# Patient Record
Sex: MALE | Race: WHITE | NOT HISPANIC OR LATINO
[De-identification: names, ages, dates, MRNs, and addresses within clinical notes are randomized per-mention and may not be internally consistent; named-entity substitution may affect disease eponyms.]

---

## 2023-11-08 ENCOUNTER — DASHBOARD ENCOUNTERS (OUTPATIENT)
Age: 83
End: 2023-11-08

## 2023-11-08 ENCOUNTER — LAB OUTSIDE AN ENCOUNTER (OUTPATIENT)
Dept: URBAN - NONMETROPOLITAN AREA CLINIC 2 | Facility: CLINIC | Age: 83
End: 2023-11-08

## 2023-11-08 ENCOUNTER — OFFICE VISIT (OUTPATIENT)
Dept: URBAN - NONMETROPOLITAN AREA CLINIC 13 | Facility: CLINIC | Age: 83
End: 2023-11-08
Payer: MEDICARE

## 2023-11-08 VITALS
HEART RATE: 66 BPM | WEIGHT: 203.6 LBS | SYSTOLIC BLOOD PRESSURE: 153 MMHG | BODY MASS INDEX: 26.98 KG/M2 | HEIGHT: 73 IN | DIASTOLIC BLOOD PRESSURE: 65 MMHG

## 2023-11-08 DIAGNOSIS — K92.1 HEMATOCHEZIA: ICD-10-CM

## 2023-11-08 DIAGNOSIS — R19.7 ACUTE DIARRHEA: ICD-10-CM

## 2023-11-08 DIAGNOSIS — Z12.11 COLON CANCER SCREENING: ICD-10-CM

## 2023-11-08 LAB
A/G RATIO: 1.5
ALBUMIN: 3.9
ALKALINE PHOSPHATASE: 52
ALT (SGPT): 11
ANION GAP: 13
AST (SGOT): 17
BASOPHILS AUTOMATED ABSOLUTE COUNT: 0.1
BASOPHILS RELATIVE PERCENT: 1
BILIRUBIN TOTAL: 0.7
BLOOD UREA NITROGEN: 18
BUN / CREAT RATIO: 19
CALCIUM: 9.1
CHLORIDE: 101
CO2: 28
CREATININE, SERUM: 0.96
EGFR (CKD-EPI): >60
EOSINOPHILS AUTOMATED ABSOLUTE COUNT: 0.4
EOSINOPHILS RELATIVE PERCENT: 4.7
GLUCOSE: 103
HEMATOCRIT: 44.7
HEMOGLOBIN: 14.8
IRON SATURATION: 24
IRON: 68
LYMPHOCYTES AUTOMATED ABSOLUTE COUNT: 1
LYMPHOCYTES RELATIVE PERCENT: 13.2
MEAN CORPUSCULAR HEMOGLOBIN CONC: 33.2
MEAN CORPUSCULAR HEMOGLOBIN: 29.7
MEAN CORPUSCULAR VOLUME: 89.4
MEAN PLATELET VOLUME: 8.6
MONOCYTES AUTOMATED ABSOLUTE COUNT: 0.7
MONOCYTES RELATIVE PERCENT: 9
NEUTROPHILS AUTOMATED ABSOLUTE: 5.6
NEUTROPHILS RELATIVE PERCENT: 72.1
PLATELET COUNT: 211
POTASSIUM: 4.6
PROTEIN TOTAL: 6.5
RED BLOOD CELL COUNT: 5
RED CELL DISTRIBUTION WIDTH: 13.7
SODIUM: 137
TOTAL IRON BINDING CAPACITY: 284
TSH: 2.63
UNSATURATED IRON BINDING CAPACITY: 216
WHITE BLOOD CELL COUNT: 7.7

## 2023-11-08 PROCEDURE — 99204 OFFICE O/P NEW MOD 45 MIN: CPT | Performed by: NURSE PRACTITIONER

## 2023-11-08 RX ORDER — METFORMIN HYDROCHLORIDE 1000 MG/1
TAKE 1 TABLET (1,000 MG) BY ORAL ROUTE 2 TIMES PER DAY WITH MORNING AND EVENING MEALS TABLET, COATED ORAL 2
Qty: 0 | Refills: 0 | Status: ACTIVE | COMMUNITY
Start: 1900-01-01 | End: 1900-01-01

## 2023-11-08 RX ORDER — ASPIRIN 325 MG/1
TAKE 1 TABLET (325 MG) BY ORAL ROUTE ONCE DAILY TABLET ORAL 1
Qty: 0 | Refills: 0 | Status: ACTIVE | COMMUNITY
Start: 1900-01-01 | End: 1900-01-01

## 2023-11-08 RX ORDER — GLIPIZIDE 5 MG
TAKE 1 TABLET (5 MG) BY ORAL ROUTE ONCE DAILY BEFORE A MEAL TABLET ORAL 1
Qty: 0 | Refills: 0 | Status: ACTIVE | COMMUNITY
Start: 1900-01-01 | End: 1900-01-01

## 2023-11-08 NOTE — HPI-TODAY'S VISIT:
11/8/2023 Mr. Alberto Zavaleta is a 83 year old male here for change in bowel habits and bright red blood per rectum. He moved to a senior living community. His diet has changed some. He is now having three BM every morning. He will then be fine the rest of the day. This is bothersome when he needs to go to Worship. He has also noticed some pain and blood when wiping. He denies any blood thinners. His last colonoscopy was 2017 with Dr Tejeda and showed diverticulosis. CS

## 2024-01-18 ENCOUNTER — OFFICE VISIT (OUTPATIENT)
Dept: URBAN - NONMETROPOLITAN AREA CLINIC 13 | Facility: CLINIC | Age: 84
End: 2024-01-18

## 2024-06-27 ENCOUNTER — OFFICE VISIT (OUTPATIENT)
Dept: URBAN - NONMETROPOLITAN AREA CLINIC 13 | Facility: CLINIC | Age: 84
End: 2024-06-27

## 2024-06-27 VITALS
BODY MASS INDEX: 27.83 KG/M2 | SYSTOLIC BLOOD PRESSURE: 137 MMHG | HEART RATE: 60 BPM | TEMPERATURE: 97.1 F | HEIGHT: 73 IN | DIASTOLIC BLOOD PRESSURE: 60 MMHG | WEIGHT: 210 LBS

## 2024-06-27 RX ORDER — METFORMIN HYDROCHLORIDE 1000 MG/1
TAKE 1 TABLET (1,000 MG) BY ORAL ROUTE 2 TIMES PER DAY WITH MORNING AND EVENING MEALS TABLET, COATED ORAL 2
Qty: 0 | Refills: 0 | Status: DISCONTINUED | COMMUNITY
Start: 1900-01-01

## 2024-06-27 RX ORDER — PRAVASTATIN SODIUM 20 MG/1
TABLET ORAL
Qty: 90 TABLET | Status: ACTIVE | COMMUNITY

## 2024-06-27 RX ORDER — ASPIRIN 325 MG/1
TAKE 1 TABLET (325 MG) BY ORAL ROUTE ONCE DAILY TABLET ORAL 1
Qty: 0 | Refills: 0 | Status: DISCONTINUED | COMMUNITY
Start: 1900-01-01

## 2024-06-27 RX ORDER — MEMANTINE HYDROCHLORIDE 5 MG/1
TABLET, FILM COATED ORAL
Qty: 60 EACH | Refills: 5 | Status: ACTIVE | COMMUNITY

## 2024-06-27 RX ORDER — GLIPIZIDE 5 MG
TAKE 1 TABLET (5 MG) BY ORAL ROUTE ONCE DAILY BEFORE A MEAL TABLET ORAL 1
Qty: 0 | Refills: 0 | Status: DISCONTINUED | COMMUNITY
Start: 1900-01-01

## 2024-06-27 NOTE — HPI-TODAY'S VISIT:
11/8/2023 Mr. Alberto Zavaleta is a 83 year old male here for change in bowel habits and bright red blood per rectum. He moved to a senior care community. His diet has changed some. He is now having three BM every morning. He will then be fine the rest of the day. This is bothersome when he needs to go to [employer]. He has also noticed some pain and blood when wiping. He denies any blood thinners. His last colonoscopy was 2017 with Dr Tejeda and showed diverticulosis. CS 6/27/2024 Alberto returns for follow-up.  Last time we saw him in November 2023 he was having some diarrhea and bright red blood per rectum. At that time it was very mild and he wanted to watch it, monitor symptoms and treat conservatively.  He returns today and actually is doing well.  He has some loose stools but this is manageable.  He takes a fiber supplement and as needed Imodium.  He denies any blood in the stool.  He remains active.  He works out 5 times a day and sings in the choir at [employer].  Denies any abdominal pain or weight loss

## 2025-08-21 ENCOUNTER — LAB OUTSIDE AN ENCOUNTER (OUTPATIENT)
Dept: URBAN - NONMETROPOLITAN AREA CLINIC 13 | Facility: CLINIC | Age: 85
End: 2025-08-21

## 2025-08-21 ENCOUNTER — OFFICE VISIT (OUTPATIENT)
Dept: URBAN - NONMETROPOLITAN AREA CLINIC 13 | Facility: CLINIC | Age: 85
End: 2025-08-21
Payer: MEDICARE

## 2025-08-21 DIAGNOSIS — R19.7 ACUTE DIARRHEA: ICD-10-CM

## 2025-08-21 DIAGNOSIS — K92.1 HEMATOCHEZIA: ICD-10-CM

## 2025-08-21 DIAGNOSIS — Z12.11 COLON CANCER SCREENING: ICD-10-CM

## 2025-08-21 DIAGNOSIS — R13.19 CERVICAL DYSPHAGIA: ICD-10-CM

## 2025-08-21 PROCEDURE — 99213 OFFICE O/P EST LOW 20 MIN: CPT | Performed by: INTERNAL MEDICINE

## 2025-08-21 RX ORDER — PRAVASTATIN SODIUM 20 MG/1
TABLET ORAL
Qty: 90 TABLET | Status: ACTIVE | COMMUNITY

## 2025-08-21 RX ORDER — MEMANTINE HYDROCHLORIDE 5 MG/1
TABLET, FILM COATED ORAL
Qty: 60 EACH | Refills: 5 | Status: ACTIVE | COMMUNITY